# Patient Record
Sex: MALE | Race: WHITE | NOT HISPANIC OR LATINO
[De-identification: names, ages, dates, MRNs, and addresses within clinical notes are randomized per-mention and may not be internally consistent; named-entity substitution may affect disease eponyms.]

---

## 2019-11-14 ENCOUNTER — APPOINTMENT (OUTPATIENT)
Dept: ORTHOPEDIC SURGERY | Facility: CLINIC | Age: 69
End: 2019-11-14
Payer: COMMERCIAL

## 2019-11-14 VITALS — HEIGHT: 71 IN | WEIGHT: 176 LBS | BODY MASS INDEX: 24.64 KG/M2

## 2019-11-14 DIAGNOSIS — M75.51 BURSITIS OF RIGHT SHOULDER: ICD-10-CM

## 2019-11-14 PROBLEM — Z00.00 ENCOUNTER FOR PREVENTIVE HEALTH EXAMINATION: Status: ACTIVE | Noted: 2019-11-14

## 2019-11-14 PROCEDURE — 99204 OFFICE O/P NEW MOD 45 MIN: CPT

## 2019-11-14 PROCEDURE — 73030 X-RAY EXAM OF SHOULDER: CPT | Mod: RT

## 2019-11-14 NOTE — DISCUSSION/SUMMARY
[de-identified] : Right shoulder x-ray shows mild sclerosis over the greater tuberosity. This patient has some weakness of his right shoulder rotator cuff. I recommend getting an MRI to rule out a rotator cuff tear. I prescribed diclofenac he will use ice. I will call him with the results of the

## 2019-11-14 NOTE — REVIEW OF SYSTEMS
[Arthralgia] : arthralgia [Joint Pain] : joint pain [Joint Swelling] : no joint swelling [Joint Stiffness] : joint stiffness [Negative] : Heme/Lymph

## 2019-11-14 NOTE — HISTORY OF PRESENT ILLNESS
[de-identified] : Location: right shoulder\par Quality: Sharp, Dull\par Duration: 1.5 month\par Context: Atraumatic\par Aggravating Factors: Lifting weights, Carrying , twisting turning \par Conservative treatment: Rest\par Associated Symptoms: Pain radiating down arm,Clicking popping \par Prior Studies: n.a \par

## 2019-11-14 NOTE — PHYSICAL EXAM
[de-identified] : Right shoulder shows no warmth or swelling. There is some tenderness over the greater tuberosity. There is a positive Neer impingement sign and a positive Winkler test. There is no pain over the acromioclavicular joint. He has slightly decreased strength of his rotator cuff compared to the left arm. His range of motion is slightly reduced in external rotation abduction internal rotation [de-identified] : Right shoulder x-ray shows evidence of sclerosis over the greater tuberosity

## 2019-11-17 ENCOUNTER — TRANSCRIPTION ENCOUNTER (OUTPATIENT)
Age: 69
End: 2019-11-17

## 2019-12-05 ENCOUNTER — TRANSCRIPTION ENCOUNTER (OUTPATIENT)
Age: 69
End: 2019-12-05

## 2020-01-13 ENCOUNTER — RX RENEWAL (OUTPATIENT)
Age: 70
End: 2020-01-13

## 2020-01-13 RX ORDER — DICLOFENAC SODIUM 100 MG/1
100 TABLET, FILM COATED, EXTENDED RELEASE ORAL
Qty: 30 | Refills: 2 | Status: ACTIVE | COMMUNITY
Start: 2019-11-14 | End: 1900-01-01